# Patient Record
Sex: FEMALE | Race: WHITE | NOT HISPANIC OR LATINO | Employment: UNEMPLOYED | URBAN - METROPOLITAN AREA
[De-identification: names, ages, dates, MRNs, and addresses within clinical notes are randomized per-mention and may not be internally consistent; named-entity substitution may affect disease eponyms.]

---
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
## 2023-04-04 ENCOUNTER — OFFICE VISIT (OUTPATIENT)
Dept: URGENT CARE | Facility: CLINIC | Age: 18
End: 2023-04-04
Payer: MEDICAID

## 2023-04-04 VITALS
HEART RATE: 85 BPM | OXYGEN SATURATION: 98 % | TEMPERATURE: 98 F | RESPIRATION RATE: 18 BRPM | HEIGHT: 65 IN | WEIGHT: 159 LBS | DIASTOLIC BLOOD PRESSURE: 78 MMHG | SYSTOLIC BLOOD PRESSURE: 127 MMHG | BODY MASS INDEX: 26.49 KG/M2

## 2023-04-04 DIAGNOSIS — L03.011 PARONYCHIA OF FINGER, RIGHT: Primary | ICD-10-CM

## 2023-04-04 PROCEDURE — 99203 OFFICE O/P NEW LOW 30 MIN: CPT | Mod: 25,S$GLB,,

## 2023-04-04 PROCEDURE — 26010 DRAINAGE OF FINGER ABSCESS: CPT | Mod: S$GLB,,,

## 2023-04-04 PROCEDURE — 99203 PR OFFICE/OUTPT VISIT, NEW, LEVL III, 30-44 MIN: ICD-10-PCS | Mod: 25,S$GLB,,

## 2023-04-04 PROCEDURE — 26010 INCISION & DRAINAGE: ICD-10-PCS | Mod: S$GLB,,,

## 2023-04-04 RX ORDER — SULFAMETHOXAZOLE AND TRIMETHOPRIM 800; 160 MG/1; MG/1
1 TABLET ORAL 2 TIMES DAILY
Qty: 14 TABLET | Refills: 0 | Status: SHIPPED | OUTPATIENT
Start: 2023-04-04 | End: 2023-04-11

## 2023-04-04 RX ORDER — MUPIROCIN 20 MG/G
OINTMENT TOPICAL 3 TIMES DAILY
Qty: 1 G | Refills: 0 | Status: SHIPPED | OUTPATIENT
Start: 2023-04-04

## 2023-04-04 NOTE — PATIENT INSTRUCTIONS
PLEASE READ YOUR DISCHARGE INSTRUCTIONS ENTIRELY AS IT CONTAINS IMPORTANT INFORMATION.  Take the antibiotics to completion.    Warm water soaks with epsom salt for about 10 minutes preferably before putting on the bactroban ointment.     Keep finger clean and dry and covered    Tylenol and ibuprofen for pain. Wait until tomorrow to take ibuprofen since you may bleed a little more today    Please return or see your primary care doctor if you develop new or worsening symptoms.     Please arrange follow up with your primary medical clinic as soon as possible. You must understand that you've received an Urgent Care treatment only and that you may be released before all of your medical problems are known or treated. You, the patient, will arrange for follow up as instructed. If your symptoms worsen or fail to improve you should go to the Emergency Room.  WE CANNOT RULE OUT ALL POSSIBLE CAUSES OF YOUR SYMPTOMS IN THE URGENT CARE SETTING PLEASE GO TO THE ER IF YOU FEELS YOUR CONDITION IS WORSENING OR YOU WOULD LIKE EMERGENT EVALUATION.

## 2023-04-04 NOTE — PROCEDURES
Incision & Drainage    Date/Time: 4/4/2023 3:15 PM  Performed by: Fanny Johnson NP  Authorized by: Fanny Johnson NP     Consent Done?:  Yes (Verbal)    Type:  Abscess  Body area:  Upper extremity  Location details:  Right long finger  Local anesthetic: Lidocaine spray  Scalpel size: 18G.  Complexity:  Simple  Drainage:  Pus  Drainage amount:  Moderate  Wound treatment:  Incision and drainage  Patient tolerance:  Patient tolerated the procedure well with no immediate complications    Bactroban and gauze dressing applied

## 2023-04-04 NOTE — PROGRESS NOTES
"Subjective:      Patient ID: Mya Clayton is a 17 y.o. female.    Vitals:  height is 5' 5" (1.651 m) and weight is 72.1 kg (159 lb). Her temperature is 98.3 °F (36.8 °C). Her blood pressure is 127/78 and her pulse is 85. Her respiration is 18 and oxygen saturation is 98%.     Chief Complaint: Hand Pain (Right middle finger swollen)    17 yr female pt presents to the clinic with right middle finger swelling and pain. Pt states that she woke up and her finger was throbbing in pain. Pt was eating crawfish yesterday and don't know if that hurt her finger or not.    Hand Pain   The incident occurred 12 to 24 hours ago. The incident occurred at home. The injury mechanism is unknown. The pain is present in the right hand. The quality of the pain is described as aching. The pain does not radiate. The pain is at a severity of 9/10. The pain is moderate. The pain has been Fluctuating since the incident. Associated symptoms include numbness and tingling. Nothing aggravates the symptoms. She has tried ice for the symptoms. The treatment provided no relief.     Skin:  Positive for erythema (erythema and swelling to left middle finger near nail bed, fluctuance noted).   Neurological:  Positive for numbness.    Objective:     Physical Exam   Constitutional: She is oriented to person, place, and time. She appears well-developed.   HENT:   Head: Normocephalic and atraumatic. Head is without abrasion, without contusion and without laceration.   Ears:   Right Ear: External ear normal.   Left Ear: External ear normal.   Nose: Nose normal.   Mouth/Throat: Oropharynx is clear and moist and mucous membranes are normal.   Eyes: Conjunctivae, EOM and lids are normal. Pupils are equal, round, and reactive to light.   Neck: Trachea normal and phonation normal. Neck supple.   Cardiovascular: Normal rate, regular rhythm and normal heart sounds.   Pulmonary/Chest: Effort normal and breath sounds normal. No stridor. No respiratory " distress.   Musculoskeletal: Normal range of motion.         General: Normal range of motion.   Neurological: She is alert and oriented to person, place, and time.   Skin: Skin is warm, dry, intact and no rash. Capillary refill takes less than 2 seconds. erythema (erythema and swelling to left middle finger near nail bed, fluctuance noted) No abrasion, No burn, No bruising and No ecchymosis   Psychiatric: Her speech is normal and behavior is normal. Judgment and thought content normal.   Nursing note and vitals reviewed.    Assessment:     1. Paronychia of finger, right        Plan:       Paronychia of finger, right  -     Incision & Drainage  -     sulfamethoxazole-trimethoprim 800-160mg (BACTRIM DS) 800-160 mg Tab; Take 1 tablet by mouth 2 (two) times daily. for 7 days  Dispense: 14 tablet; Refill: 0  -     mupirocin (BACTROBAN) 2 % ointment; Apply topically 3 (three) times daily.  Dispense: 1 g; Refill: 0    Pt in no acute distress. Vitals reassuring. I&D performed with moderate amount of drainage. Discussed treatment with Bactrim, Bactroban and warm soaks. Discussed OTC medications including Tylenol/Motrin. Discussed the importance of further evaluation if symptoms worsen. Patient stated verbal understanding.  Patient Instructions   PLEASE READ YOUR DISCHARGE INSTRUCTIONS ENTIRELY AS IT CONTAINS IMPORTANT INFORMATION.  Take the antibiotics to completion.    Warm water soaks with epsom salt for about 10 minutes preferably before putting on the bactroban ointment.     Keep finger clean and dry and covered    Tylenol and ibuprofen for pain. Wait until tomorrow to take ibuprofen since you may bleed a little more today    Please return or see your primary care doctor if you develop new or worsening symptoms.     Please arrange follow up with your primary medical clinic as soon as possible. You must understand that you've received an Urgent Care treatment only and that you may be released before all of your medical  problems are known or treated. You, the patient, will arrange for follow up as instructed. If your symptoms worsen or fail to improve you should go to the Emergency Room.  WE CANNOT RULE OUT ALL POSSIBLE CAUSES OF YOUR SYMPTOMS IN THE URGENT CARE SETTING PLEASE GO TO THE ER IF YOU FEELS YOUR CONDITION IS WORSENING OR YOU WOULD LIKE EMERGENT EVALUATION.